# Patient Record
Sex: MALE | Race: BLACK OR AFRICAN AMERICAN | NOT HISPANIC OR LATINO | URBAN - METROPOLITAN AREA
[De-identification: names, ages, dates, MRNs, and addresses within clinical notes are randomized per-mention and may not be internally consistent; named-entity substitution may affect disease eponyms.]

---

## 2018-11-01 ENCOUNTER — EMERGENCY (EMERGENCY)
Facility: HOSPITAL | Age: 48
LOS: 1 days | Discharge: HOME | End: 2018-11-01
Attending: STUDENT IN AN ORGANIZED HEALTH CARE EDUCATION/TRAINING PROGRAM
Payer: MEDICARE

## 2018-11-01 VITALS
HEART RATE: 84 BPM | DIASTOLIC BLOOD PRESSURE: 98 MMHG | RESPIRATION RATE: 20 BRPM | SYSTOLIC BLOOD PRESSURE: 177 MMHG | OXYGEN SATURATION: 95 % | WEIGHT: 184.97 LBS | HEIGHT: 65 IN | TEMPERATURE: 98 F

## 2018-11-01 PROCEDURE — 99283 EMERGENCY DEPT VISIT LOW MDM: CPT

## 2018-11-01 NOTE — ED PROVIDER NOTE - OBJECTIVE STATEMENT
48 y.o w/ no sig pmh presenting with 2 days of left facial swelling. endorses mild pain. denies difficulty speaking, headache, n, v, fever, difficulty swallowing.

## 2018-11-01 NOTE — ED PROVIDER NOTE - MEDICAL DECISION MAKING DETAILS
jesse vs dental abscess. abx, pmd/dental f/u patient presenting with left facial swelling. no erythema of skin to suggest cellulitis. poor dentition +. no ttp of oral floor. no resp distress, difficulty swallowing. likely dental abscess vs sialadenitis. will give abx. dental and pmd f.u. otherwise vital normal. tolerating po. no signs of sepsis. no signs of deeper infection on exam. return precaution for airway involvement, fever, vomiting or worsening swelling not responding to abx.

## 2018-11-03 ENCOUNTER — EMERGENCY (EMERGENCY)
Facility: HOSPITAL | Age: 48
LOS: 1 days | Discharge: ANOTHER HOSPITAL | End: 2018-11-03
Attending: EMERGENCY MEDICINE
Payer: MEDICARE

## 2018-11-03 ENCOUNTER — EMERGENCY (EMERGENCY)
Facility: HOSPITAL | Age: 48
LOS: 1 days | Discharge: HOME | End: 2018-11-03
Attending: EMERGENCY MEDICINE | Admitting: EMERGENCY MEDICINE
Payer: MEDICARE

## 2018-11-03 VITALS
OXYGEN SATURATION: 97 % | SYSTOLIC BLOOD PRESSURE: 143 MMHG | RESPIRATION RATE: 16 BRPM | TEMPERATURE: 99 F | HEART RATE: 63 BPM | DIASTOLIC BLOOD PRESSURE: 93 MMHG

## 2018-11-03 VITALS
OXYGEN SATURATION: 98 % | SYSTOLIC BLOOD PRESSURE: 141 MMHG | RESPIRATION RATE: 20 BRPM | DIASTOLIC BLOOD PRESSURE: 79 MMHG | HEART RATE: 69 BPM

## 2018-11-03 VITALS
WEIGHT: 182.98 LBS | SYSTOLIC BLOOD PRESSURE: 152 MMHG | RESPIRATION RATE: 18 BRPM | OXYGEN SATURATION: 96 % | DIASTOLIC BLOOD PRESSURE: 87 MMHG | HEART RATE: 80 BPM | TEMPERATURE: 99 F
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
## 2018-11-03 LAB
GRAM STN SPEC: SIGNIFICANT CHANGE UP
SPECIMEN SOURCE: SIGNIFICANT CHANGE UP

## 2018-11-03 PROCEDURE — 99220: CPT

## 2018-11-03 PROCEDURE — 99285 EMERGENCY DEPT VISIT HI MDM: CPT | Mod: 25

## 2018-11-03 PROCEDURE — 87040 BLOOD CULTURE FOR BACTERIA: CPT

## 2018-11-03 PROCEDURE — 70491 CT SOFT TISSUE NECK W/DYE: CPT | Mod: 26

## 2018-11-03 PROCEDURE — 99285 EMERGENCY DEPT VISIT HI MDM: CPT

## 2018-11-03 PROCEDURE — 85027 COMPLETE CBC AUTOMATED: CPT

## 2018-11-03 PROCEDURE — 96374 THER/PROPH/DIAG INJ IV PUSH: CPT | Mod: XU

## 2018-11-03 PROCEDURE — 80053 COMPREHEN METABOLIC PANEL: CPT

## 2018-11-03 PROCEDURE — 70491 CT SOFT TISSUE NECK W/DYE: CPT

## 2018-11-03 RX ORDER — OXYCODONE AND ACETAMINOPHEN 5; 325 MG/1; MG/1
1 TABLET ORAL ONCE
Qty: 0 | Refills: 0 | Status: DISCONTINUED | OUTPATIENT
Start: 2018-11-03 | End: 2018-11-03

## 2018-11-03 RX ORDER — MORPHINE SULFATE 50 MG/1
2 CAPSULE, EXTENDED RELEASE ORAL ONCE
Qty: 0 | Refills: 0 | Status: DISCONTINUED | OUTPATIENT
Start: 2018-11-03 | End: 2018-11-03

## 2018-11-03 RX ORDER — MORPHINE SULFATE 50 MG/1
4 CAPSULE, EXTENDED RELEASE ORAL ONCE
Qty: 0 | Refills: 0 | Status: DISCONTINUED | OUTPATIENT
Start: 2018-11-03 | End: 2018-11-03

## 2018-11-03 RX ORDER — KETOROLAC TROMETHAMINE 30 MG/ML
30 SYRINGE (ML) INJECTION EVERY 6 HOURS
Qty: 0 | Refills: 0 | Status: COMPLETED | OUTPATIENT
Start: 2018-11-03 | End: 2018-11-04

## 2018-11-03 RX ORDER — KETOROLAC TROMETHAMINE 30 MG/ML
15 SYRINGE (ML) INJECTION ONCE
Qty: 0 | Refills: 0 | Status: DISCONTINUED | OUTPATIENT
Start: 2018-11-03 | End: 2018-11-03

## 2018-11-03 RX ADMIN — Medication 900 MILLIGRAM(S): at 22:05

## 2018-11-03 RX ADMIN — OXYCODONE AND ACETAMINOPHEN 1 TABLET(S): 5; 325 TABLET ORAL at 21:00

## 2018-11-03 RX ADMIN — MORPHINE SULFATE 4 MILLIGRAM(S): 50 CAPSULE, EXTENDED RELEASE ORAL at 22:00

## 2018-11-03 RX ADMIN — Medication 30 MILLIGRAM(S): at 23:38

## 2018-11-03 RX ADMIN — OXYCODONE AND ACETAMINOPHEN 1 TABLET(S): 5; 325 TABLET ORAL at 13:16

## 2018-11-03 RX ADMIN — MORPHINE SULFATE 4 MILLIGRAM(S): 50 CAPSULE, EXTENDED RELEASE ORAL at 21:25

## 2018-11-03 RX ADMIN — Medication 15 MILLIGRAM(S): at 20:50

## 2018-11-03 RX ADMIN — Medication 100 MILLIGRAM(S): at 13:15

## 2018-11-03 RX ADMIN — MORPHINE SULFATE 2 MILLIGRAM(S): 50 CAPSULE, EXTENDED RELEASE ORAL at 17:28

## 2018-11-03 RX ADMIN — MORPHINE SULFATE 2 MILLIGRAM(S): 50 CAPSULE, EXTENDED RELEASE ORAL at 17:45

## 2018-11-03 RX ADMIN — Medication 15 MILLIGRAM(S): at 17:28

## 2018-11-03 RX ADMIN — Medication 100 MILLIGRAM(S): at 21:33

## 2018-11-03 RX ADMIN — OXYCODONE AND ACETAMINOPHEN 1 TABLET(S): 5; 325 TABLET ORAL at 21:25

## 2018-11-03 NOTE — ED PROVIDER NOTE - PROGRESS NOTE DETAILS
Discussed results with OMFS, patient to be transferred to [facility] for dental eval with possible OMFS eval, will transfer to [facility] ED with Dr. Tay accepting.

## 2018-11-03 NOTE — ED PROVIDER NOTE - PHYSICAL EXAMINATION
FACE & NECK: swelling to L side of face. L lower jaw w/ large area of induration, swelling, redness, tenderness, extending down underneath jaw to L neck.

## 2018-11-03 NOTE — CONSULT NOTE ADULT - ASSESSMENT
48y old male with localized facial swelling   - Localized swelling is likely follicular in origin and not tooth related  - Consult plastics for I&D or other management of facial swelling  - Discussed with patient importance of improved oral hygiene and need to see general dentist in future

## 2018-11-03 NOTE — ED CDU PROVIDER INITIAL DAY NOTE - INDICATION FOR OBSERVATION
Therapeutic Intensity Therapeutic Intensity/IV antibiotics, supportive care, analgesia PRN, general observation care / monitoring.

## 2018-11-03 NOTE — ED PROVIDER NOTE - PROGRESS NOTE DETAILS
Cabot: Dental and OMFS do not feel that this is tooth related. Cabot: Plastics was able to drain a significant amount of purulent material from the cheek.  Will speak with CDU about IV abx and observation.

## 2018-11-03 NOTE — ED CDU PROVIDER INITIAL DAY NOTE - ENMT, MLM
Airway patent. Nasal mucosa clear. Mouth with moist mucosa. Pt s/p I&D; left cheek with gauze 4x4 partially packing I&D site; overlying gauze clean with minimal blood; no active bleeding noted from wound.  Neck supple.
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
## 2018-11-03 NOTE — ED CDU PROVIDER INITIAL DAY NOTE - PROGRESS NOTE DETAILS
Cabot: 48M who was transferred from [facility] for L lower face infection with possible tooth involvement x 4d.  Has been prescribed clinda, which has not resulted in improvement.  At [facility] pt had CT showing possible infected left maxillary first molar with development of a radicular cyst and adjacent myositis, soft tissue swelling, edema and cellulitis/phlegmon without drainable fluid collection, as well a reactive left-sided cervical lymphadenopathy.  Tolerates PO, no trouble breathing or swallowing.  Dental saw the patient and did not feel this was a dental issue.  Plastics I+D's the cheek with significant purulent material drainage.  Will admit to the CDU for IV abx and monitoring for improvement.  On exam, HDS, NAD, MMM, teeth NTTP, + L lower facial edema, induration, erythema with TTP s/p I+D, extending down the neck, eyes clear, lungs CTAB, heart sounds normal, abd soft, NT, ND, no CVAT, LEs without edema, wwp, neuro: alert and oriented, no focal deficits.

## 2018-11-03 NOTE — ED CDU PROVIDER INITIAL DAY NOTE - ATTENDING CONTRIBUTION TO CARE
I, Jennifer Cabot, MD, have performed a history and physical exam of the patient and discussed their management with the ACP.  I reviewed the PA's note and agree with the documented findings and plan of care. My medical decision making and observations are found above.    Cabot: 48M who was transferred from [facility] for L lower face infection with possible tooth involvement x 4d.  Has been prescribed clinda, which has not resulted in improvement.  At [facility] pt had CT showing possible infected left maxillary first molar with development of a radicular cyst and adjacent myositis, soft tissue swelling, edema and cellulitis/phlegmon without drainable fluid collection, as well a reactive left-sided cervical lymphadenopathy.  Tolerates PO, no trouble breathing or swallowing.  Dental saw the patient and did not feel this was a dental issue.  Plastics I+D's the cheek with significant purulent material drainage.  Will admit to the CDU for IV abx and monitoring for improvement.  On exam, HDS, NAD, MMM, teeth NTTP, + L lower facial edema, induration, erythema with TTP s/p I+D, extending down the neck, eyes clear, lungs CTAB, heart sounds normal, abd soft, NT, ND, no CVAT, LEs without edema, wwp, neuro: alert and oriented, no focal deficits.

## 2018-11-03 NOTE — ED ADULT NURSE NOTE - CHIEF COMPLAINT QUOTE
BIBEMS as transfer from [facility] for abscess to L face. Given Percocet x 1 and Clindamycin IV at [facility]. Arrives with IV to L ac 20g. Hx: HARRY

## 2018-11-03 NOTE — ED PROVIDER NOTE - NOTES
Initially spoke with [facility] Noble Horta who was aware of patient who states he reviewed CT scan and recommends dental evaluation first.  [facility] states they are available if dental team requires it.
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
## 2018-11-03 NOTE — ED PROVIDER NOTE - OBJECTIVE STATEMENT
49 y/o M w/ no significant PMHx presents to ED w/ c/o mass to L side of face, w/ worsening swelling and spreading down to neck x 2 days. Pt was seen by PMD and given Clindamycin, did not have any imaging study done at the time. Pt is now in ED due to worsening symptoms despite compliance w/ medication. Pt denies any dental pain, fever, or any other complaints. Allergy: Penicillin

## 2018-11-03 NOTE — ED ADULT NURSE NOTE - OBJECTIVE STATEMENT
Patient presents to ED c/o left sided facial mass. As per patient it started out 4 days ago with mild swelling increased , was in ER 2 days ago was discharged on Clindamycin and was sent home. Swelling continue with pain and difficulty swallowing so patient came back to Ed.

## 2018-11-03 NOTE — ED PROVIDER NOTE - ATTENDING CONTRIBUTION TO CARE
I, Jennifer Cabot, MD, have performed a history and physical exam of the patient and discussed their management with the ACP.  I reviewed the PA's note and agree with the documented findings and plan of care. My medical decision making and observations are found above.    Cabot: 48M who was transferred from [facility] for L lower face infection with possible tooth involvement x 4d.  Has been prescribed clinda, which has not resulted in improvement.  At [facility] pt had CT showing infected left maxillary first molar with development of a radicular cyst and adjacent myositis, soft tissue swelling, edema and cellulitis/phlegmon without drainable fluid collection, as well a reactive left-sided cervical lymphadenopathy.  Tolerates PO, no trouble breathing or swallowing.  On exam, HDS, NAD, MMM, teeth NTTP, + L lower facial edema, induration, erythema with TTP, no fluctuance, extending down the neck, eyes clear, lungs CTAB, heart sounds normal, abd soft, NT, ND, no CVAT, LEs without edema, wwp, neuro: alert and oriented, no focal deficits.  Will consult dental and OMFS, will require admission for IV abx.

## 2018-11-03 NOTE — ED ADULT NURSE NOTE - ED STAT RN HANDOFF DETAILS
Patient to be transferred to [facility] report given to Krysten at [facility] , patient to be picked up within the hour, pain medication along with antibiotic  given continue to monitor patient.
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
## 2018-11-03 NOTE — ED ADULT TRIAGE NOTE - CHIEF COMPLAINT QUOTE
BIBEMS as transfer from [facility] for abscess to L face. Given Percocet x 1 and Clindamycin IV at [facility]. Arrives with IV to L ac 20g. Hx: HARRY

## 2018-11-03 NOTE — DOWNTIME INTERRUPTION NOTE - WHICH MANUAL FORMS INITIATED?
Emergency Department Nursing Care Record  Emergency triage form  Emergency department Laboratory orders

## 2018-11-03 NOTE — ED CDU PROVIDER INITIAL DAY NOTE - MEDICAL DECISION MAKING DETAILS
Cabot: 48M who was transferred from [facility] for L lower face infection with possible tooth involvement x 4d.  Has been prescribed clinda, which has not resulted in improvement.  At [facility] pt had CT showing possible infected left maxillary first molar with development of a radicular cyst and adjacent myositis, soft tissue swelling, edema and cellulitis/phlegmon without drainable fluid collection, as well a reactive left-sided cervical lymphadenopathy.  Tolerates PO, no trouble breathing or swallowing.  Dental saw the patient and did not feel this was a dental issue.  Plastics I+D's the cheek with significant purulent material drainage.  Will admit to the CDU for IV abx and monitoring for improvement.  On exam, HDS, NAD, MMM, teeth NTTP, + L lower facial edema, induration, erythema with TTP s/p I+D, extending down the neck, eyes clear, lungs CTAB, heart sounds normal, abd soft, NT, ND, no CVAT, LEs without edema, wwp, neuro: alert and oriented, no focal deficits.

## 2018-11-03 NOTE — ED ADULT NURSE NOTE - CHPI ED NUR SYMPTOMS NEG
no disorientation/no paranoia/no change in level of consciousness/no weight loss/no fever/no hallucinations/no weakness/no agitation/no suicidal

## 2018-11-03 NOTE — ED PROVIDER NOTE - SKIN LOCATION #1
8 cm x 4 cm area of induration, swelling, redness and warmth with associated tenderness to left lower aspect of face; no open areas; no crepitus

## 2018-11-03 NOTE — ED PROVIDER NOTE - MEDICAL DECISION MAKING DETAILS
49 y/o M here w/ worsening swelling to L side of face, jaw, neck. Will need to check CT-scan maxillofacial/ neck to evaluate for infection. Will check labs and consider possible ENT transfer.

## 2018-11-03 NOTE — ED ADULT NURSE NOTE - NSIMPLEMENTINTERV_GEN_ALL_ED
Implemented All Universal Safety Interventions:  [place] to call system. Call bell, personal items and telephone within reach. Instruct patient to call for assistance. Room bathroom lighting operational. Non-slip footwear when patient is off stretcher. Physically safe environment: no spills, clutter or unnecessary equipment. Stretcher in lowest position, wheels locked, appropriate side rails in place.

## 2018-11-03 NOTE — ED ADULT NURSE NOTE - OBJECTIVE STATEMENT
Presents to ED complaining of left facial edema and pain x 4 days, worse now.  Seen by PCP and given Clindamycin without relief. Presents to ED complaining of left facial edema and pain x 4 days, worse now.  Seen by PCP and given Clindamycin without relief.  No fever, chills or difficulty swallowing.  Denies trauma to face.

## 2018-11-04 VITALS
RESPIRATION RATE: 16 BRPM | OXYGEN SATURATION: 98 % | HEART RATE: 67 BPM | SYSTOLIC BLOOD PRESSURE: 148 MMHG | DIASTOLIC BLOOD PRESSURE: 90 MMHG | TEMPERATURE: 99 F

## 2018-11-04 PROCEDURE — 99217: CPT

## 2018-11-04 RX ORDER — OXYCODONE AND ACETAMINOPHEN 5; 325 MG/1; MG/1
1 TABLET ORAL ONCE
Qty: 0 | Refills: 0 | Status: DISCONTINUED | OUTPATIENT
Start: 2018-11-04 | End: 2018-11-04

## 2018-11-04 RX ADMIN — Medication 30 MILLIGRAM(S): at 08:22

## 2018-11-04 RX ADMIN — Medication 100 MILLIGRAM(S): at 06:15

## 2018-11-04 RX ADMIN — Medication 30 MILLIGRAM(S): at 06:15

## 2018-11-04 RX ADMIN — Medication 30 MILLIGRAM(S): at 00:10

## 2018-11-04 RX ADMIN — OXYCODONE AND ACETAMINOPHEN 1 TABLET(S): 5; 325 TABLET ORAL at 02:33

## 2018-11-04 RX ADMIN — OXYCODONE AND ACETAMINOPHEN 1 TABLET(S): 5; 325 TABLET ORAL at 03:20
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
## 2018-11-04 NOTE — ED CDU PROVIDER SUBSEQUENT DAY NOTE - PROGRESS NOTE DETAILS
Pt objectively noted to be resting comfortably in the interim; no issues or c/o thus far.  Will continue to monitor; pt will be signed out to the day CDU attending and PA at 0700 hrs. Upon discharge pt states he has 8 days left of clinda dosed as 300mg 4x daily, will send extra 2 days of abx to continue pt on 300mg every 4 hours dosing Upon discharge pt states he has 8 days left of clinda dosed as 300mg 4x daily, will send extra 2 days of abx to continue pt on 300mg every 4 hours dosing. Packing changed prior to d/c, pt given supplies to change packing at home and wife understands how to change the packing
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
## 2018-11-04 NOTE — PROVIDER CONTACT NOTE (OTHER) - ASSESSMENT
Medical team  referred this case as pt has been medically cleared for discharge. Pt was seen in [place] and was transferred to treatment but pt claims he has no money and or family to come and  at this time. Montser spoke with the medical team and was informed pt could go via cab. Writer contacted [employer] cab # [phone] and  cab fare $ 58. Writer spoke with  BARRINGTON Meier ([id number]) agreed to pay the maximum limit $ 50 and pt willing to pay $ 8 and writer informed that it is  one time courtesy  deal.  Writer informed to the pt and was in agreement. Writer informed the medical team.
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
## 2018-11-04 NOTE — ED CDU PROVIDER DISPOSITION NOTE - PLAN OF CARE
Follow up with plastic surgery Dr.Sayeed Pretty in 1 week, referral information provided  Follow up with your primary care provider within 48 hours  Change packing daily  Take Clindamycin 450mg (3 tablets) 3 times a day for 10 days  Take Motrin 600mg every 6 hours for pain, take with food  Take Percocet 1 tablet every 6 hours for severe pain, caution drowsiness do not drive or drink alcohol while taking  Return to the ER with any worsening or concerning symptoms, increased pain or swelling, fever, chills, pus drainage or any other concerns.

## 2018-11-06 LAB — CULTURE RESULTS: SIGNIFICANT CHANGE UP

## 2018-11-06 RX ORDER — AZTREONAM 2 G
1 VIAL (EA) INJECTION
Qty: 20 | Refills: 0 | OUTPATIENT
Start: 2018-11-06 | End: 2018-11-15

## 2018-11-06 NOTE — ED POST DISCHARGE NOTE - REASON FOR FOLLOW-UP
Other Patient called saying having allergic reaction to clindamycin, having hives. Stopped taking the clindamycin, asking for a new prescription. Cx still do not have sensitivities, sent rx for bactrim, and told pt to f/u with plastics and let them know about change in meds.
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
## 2020-11-06 NOTE — ED ADULT TRIAGE NOTE - WEIGHT IN KG
Please schedule for colonoscopy and egd  History of colon polyps, acid reflux  Suprep, two bottles mag citrate one with each part of prep    Miralax bid until colonoscopy      
83.9

## 2022-01-15 ENCOUNTER — EMERGENCY (EMERGENCY)
Facility: HOSPITAL | Age: 52
LOS: 1 days | Discharge: HOME | End: 2022-01-15
Attending: STUDENT IN AN ORGANIZED HEALTH CARE EDUCATION/TRAINING PROGRAM | Admitting: STUDENT IN AN ORGANIZED HEALTH CARE EDUCATION/TRAINING PROGRAM
Payer: MEDICARE

## 2022-01-15 VITALS
OXYGEN SATURATION: 100 % | RESPIRATION RATE: 18 BRPM | HEIGHT: 65 IN | DIASTOLIC BLOOD PRESSURE: 85 MMHG | TEMPERATURE: 98 F | SYSTOLIC BLOOD PRESSURE: 145 MMHG | HEART RATE: 78 BPM
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
## 2022-01-15 PROBLEM — F17.210 NICOTINE DEPENDENCE, CIGARETTES, UNCOMPLICATED: Chronic | Status: ACTIVE | Noted: 2018-11-04

## 2022-01-15 PROCEDURE — 99283 EMERGENCY DEPT VISIT LOW MDM: CPT

## 2022-01-15 RX ORDER — FAMOTIDINE 10 MG/ML
20 INJECTION INTRAVENOUS ONCE
Refills: 0 | Status: COMPLETED | OUTPATIENT
Start: 2022-01-15 | End: 2022-01-15

## 2022-01-15 RX ORDER — ESOMEPRAZOLE MAGNESIUM 40 MG/1
1 CAPSULE, DELAYED RELEASE ORAL
Qty: 30 | Refills: 0
Start: 2022-01-15 | End: 2022-02-13

## 2022-01-15 RX ORDER — LIDOCAINE 4 G/100G
10 CREAM TOPICAL ONCE
Refills: 0 | Status: COMPLETED | OUTPATIENT
Start: 2022-01-15 | End: 2022-01-15

## 2022-01-15 RX ORDER — ONDANSETRON 8 MG/1
4 TABLET, FILM COATED ORAL ONCE
Refills: 0 | Status: COMPLETED | OUTPATIENT
Start: 2022-01-15 | End: 2022-01-15

## 2022-01-15 RX ADMIN — LIDOCAINE 10 MILLILITER(S): 4 CREAM TOPICAL at 10:33

## 2022-01-15 RX ADMIN — FAMOTIDINE 20 MILLIGRAM(S): 10 INJECTION INTRAVENOUS at 10:31

## 2022-01-15 RX ADMIN — Medication 30 MILLILITER(S): at 10:32

## 2022-01-15 RX ADMIN — ONDANSETRON 4 MILLIGRAM(S): 8 TABLET, FILM COATED ORAL at 10:31
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
## 2022-01-15 NOTE — ED ADULT NURSE NOTE - OBJECTIVE STATEMENT
Receive pt. in Results waiting alert and oriented x 4, presenting to the ER with complaints of vomiting and stomach pain. Pt. stated" I try some new food with my son and my stomach has been hurting and I have some vomiting since Friday". Medicated as ordered, no active vomiting at present, will continue to monitor.

## 2022-01-15 NOTE — ED ADULT NURSE NOTE - NSIMPLEMENTINTERV_GEN_ALL_ED
Implemented All Universal Safety Interventions:  [place] to call system. Call bell, personal items and telephone within reach. Instruct patient to call for assistance. Room bathroom lighting operational. Non-slip footwear when patient is off stretcher. Physically safe environment: no spills, clutter or unnecessary equipment. Stretcher in lowest position, wheels locked, appropriate side rails in place.
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
## 2022-01-15 NOTE — ED PROVIDER NOTE - NS ED ROS FT
Constitutional:  See HPI  ENMT: No neck pain or stiffness  Cardiac:  No chest pain  Respiratory:  No cough or respiratory distress.   GI:  +nausea/vomiting, burning epigastric pain now improved; no diarrhea  MS:  No back pain.  Neuro:  No headache   Skin:  No skin rash  Except as documented in the HPI,  all other systems are negative

## 2022-01-15 NOTE — ED PROVIDER NOTE - PATIENT PORTAL LINK FT
You can access the FollowMyHealth Patient Portal offered by [facility] [place] by registering at the following website: [email or web address]. By joining [employer]s FollowMyHealth portal, you will also be able to view your health information using other applications (apps) compatible with our system.

## 2022-01-15 NOTE — ED PROVIDER NOTE - NSFOLLOWUPINSTRUCTIONS_ED_ALL_ED_FT
Gastritis    Gastritis is soreness and swelling (inflammation) of the lining of the stomach. Gastritis can develop as a sudden onset (acute) or long-term (chronic) condition. If gastritis is not treated, it can lead to stomach bleeding and ulcers. Causes include viral and bacterial infections, excessive alcohol consumption, tobacco use, or certain medications. Symptoms include nausea, vomiting, or abdominal pain or burning especially after eating. Avoid foods or drinks that make your symptoms worse such as caffeine, chocolate, spicy foods, acidic foods, or alcohol.    Please follow up with your Gastroenterologist for further evaluation.  Continue Nexium for now.     SEEK IMMEDIATE MEDICAL CARE IF YOU HAVE ANY OF THE FOLLOWING SYMPTOMS: black or bloody stools, blood or coffee-ground-colored vomitus, worsening abdominal pain, fever, or inability to keep fluids down.

## 2022-01-15 NOTE — ED PROVIDER NOTE - NSFOLLOWUPCLINICS_GEN_ALL_ED_FT
[facility]  Gastroenterology  [address] [place] [address] [address]  [place] [address]  Phone: [phone]  Fax:

## 2022-01-15 NOTE — ED PROVIDER NOTE - PHYSICAL EXAMINATION
CONSTITUTIONAL: NAD, comfortable appearing, non-toxic  SKIN: Warm dry  HEAD: NCAT  EYES: NL inspection  ENT: MMM  NECK: Supple; non tender.  CARD: RRR  RESP: CTAB  ABD: soft, NTND, no epigastric tenderness, normoactive BS.   EXT: no pedal edema  NEURO: Grossly unremarkable  PSYCH: Cooperative, appropriate.

## 2022-02-17 ENCOUNTER — EMERGENCY (EMERGENCY)
Facility: HOSPITAL | Age: 52
LOS: 1 days | Discharge: HOME | End: 2022-02-17
Attending: EMERGENCY MEDICINE | Admitting: EMERGENCY MEDICINE
Payer: MEDICARE

## 2022-02-17 VITALS
DIASTOLIC BLOOD PRESSURE: 99 MMHG | SYSTOLIC BLOOD PRESSURE: 154 MMHG | OXYGEN SATURATION: 97 % | RESPIRATION RATE: 18 BRPM | HEIGHT: 65 IN | TEMPERATURE: 98 F | HEART RATE: 86 BPM
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
## 2022-02-17 LAB
ALBUMIN SERPL ELPH-MCNC: 4.4 G/DL — SIGNIFICANT CHANGE UP (ref 3.3–5)
ALP SERPL-CCNC: 61 U/L — SIGNIFICANT CHANGE UP (ref 40–120)
ALT FLD-CCNC: 24 U/L — SIGNIFICANT CHANGE UP (ref 4–41)
ANION GAP SERPL CALC-SCNC: 13 MMOL/L — SIGNIFICANT CHANGE UP (ref 7–14)
APPEARANCE UR: CLEAR — SIGNIFICANT CHANGE UP
AST SERPL-CCNC: 28 U/L — SIGNIFICANT CHANGE UP (ref 4–40)
B PERT DNA SPEC QL NAA+PROBE: SIGNIFICANT CHANGE UP
B PERT+PARAPERT DNA PNL SPEC NAA+PROBE: SIGNIFICANT CHANGE UP
BASOPHILS # BLD AUTO: 0.02 K/UL — SIGNIFICANT CHANGE UP (ref 0–0.2)
BASOPHILS NFR BLD AUTO: 0.3 % — SIGNIFICANT CHANGE UP (ref 0–2)
BILIRUB SERPL-MCNC: 0.8 MG/DL — SIGNIFICANT CHANGE UP (ref 0.2–1.2)
BILIRUB UR-MCNC: NEGATIVE — SIGNIFICANT CHANGE UP
BORDETELLA PARAPERTUSSIS (RAPRVP): SIGNIFICANT CHANGE UP
BUN SERPL-MCNC: 8 MG/DL — SIGNIFICANT CHANGE UP (ref 7–23)
C PNEUM DNA SPEC QL NAA+PROBE: SIGNIFICANT CHANGE UP
CALCIUM SERPL-MCNC: 9.4 MG/DL — SIGNIFICANT CHANGE UP (ref 8.4–10.5)
CHLORIDE SERPL-SCNC: 105 MMOL/L — SIGNIFICANT CHANGE UP (ref 98–107)
CO2 SERPL-SCNC: 20 MMOL/L — LOW (ref 22–31)
COLOR SPEC: YELLOW — SIGNIFICANT CHANGE UP
CREAT SERPL-MCNC: 0.72 MG/DL — SIGNIFICANT CHANGE UP (ref 0.5–1.3)
DIFF PNL FLD: NEGATIVE — SIGNIFICANT CHANGE UP
EOSINOPHIL # BLD AUTO: 0.04 K/UL — SIGNIFICANT CHANGE UP (ref 0–0.5)
EOSINOPHIL NFR BLD AUTO: 0.6 % — SIGNIFICANT CHANGE UP (ref 0–6)
FLUAV SUBTYP SPEC NAA+PROBE: SIGNIFICANT CHANGE UP
FLUBV RNA SPEC QL NAA+PROBE: SIGNIFICANT CHANGE UP
GLUCOSE SERPL-MCNC: 95 MG/DL — SIGNIFICANT CHANGE UP (ref 70–99)
GLUCOSE UR QL: NEGATIVE — SIGNIFICANT CHANGE UP
HADV DNA SPEC QL NAA+PROBE: SIGNIFICANT CHANGE UP
HCOV 229E RNA SPEC QL NAA+PROBE: SIGNIFICANT CHANGE UP
HCOV HKU1 RNA SPEC QL NAA+PROBE: SIGNIFICANT CHANGE UP
HCOV NL63 RNA SPEC QL NAA+PROBE: SIGNIFICANT CHANGE UP
HCOV OC43 RNA SPEC QL NAA+PROBE: SIGNIFICANT CHANGE UP
HCT VFR BLD CALC: 39.5 % — SIGNIFICANT CHANGE UP (ref 39–50)
HGB BLD-MCNC: 13.4 G/DL — SIGNIFICANT CHANGE UP (ref 13–17)
HMPV RNA SPEC QL NAA+PROBE: SIGNIFICANT CHANGE UP
HPIV1 RNA SPEC QL NAA+PROBE: SIGNIFICANT CHANGE UP
HPIV2 RNA SPEC QL NAA+PROBE: SIGNIFICANT CHANGE UP
HPIV3 RNA SPEC QL NAA+PROBE: SIGNIFICANT CHANGE UP
HPIV4 RNA SPEC QL NAA+PROBE: SIGNIFICANT CHANGE UP
IANC: 4.25 K/UL — SIGNIFICANT CHANGE UP (ref 1.5–8.5)
IMM GRANULOCYTES NFR BLD AUTO: 0.1 % — SIGNIFICANT CHANGE UP (ref 0–1.5)
KETONES UR-MCNC: NEGATIVE — SIGNIFICANT CHANGE UP
LEUKOCYTE ESTERASE UR-ACNC: NEGATIVE — SIGNIFICANT CHANGE UP
LIDOCAIN IGE QN: 22 U/L — SIGNIFICANT CHANGE UP (ref 7–60)
LYMPHOCYTES # BLD AUTO: 2.34 K/UL — SIGNIFICANT CHANGE UP (ref 1–3.3)
LYMPHOCYTES # BLD AUTO: 32.7 % — SIGNIFICANT CHANGE UP (ref 13–44)
M PNEUMO DNA SPEC QL NAA+PROBE: SIGNIFICANT CHANGE UP
MCHC RBC-ENTMCNC: 32.1 PG — SIGNIFICANT CHANGE UP (ref 27–34)
MCHC RBC-ENTMCNC: 33.9 GM/DL — SIGNIFICANT CHANGE UP (ref 32–36)
MCV RBC AUTO: 94.5 FL — SIGNIFICANT CHANGE UP (ref 80–100)
MONOCYTES # BLD AUTO: 0.5 K/UL — SIGNIFICANT CHANGE UP (ref 0–0.9)
MONOCYTES NFR BLD AUTO: 7 % — SIGNIFICANT CHANGE UP (ref 2–14)
NEUTROPHILS # BLD AUTO: 4.25 K/UL — SIGNIFICANT CHANGE UP (ref 1.8–7.4)
NEUTROPHILS NFR BLD AUTO: 59.3 % — SIGNIFICANT CHANGE UP (ref 43–77)
NITRITE UR-MCNC: NEGATIVE — SIGNIFICANT CHANGE UP
NRBC # BLD: 0 /100 WBCS — SIGNIFICANT CHANGE UP
NRBC # FLD: 0 K/UL — SIGNIFICANT CHANGE UP
PH UR: 6.5 — SIGNIFICANT CHANGE UP (ref 5–8)
PLATELET # BLD AUTO: 147 K/UL — LOW (ref 150–400)
POTASSIUM SERPL-MCNC: 4 MMOL/L — SIGNIFICANT CHANGE UP (ref 3.5–5.3)
POTASSIUM SERPL-SCNC: 4 MMOL/L — SIGNIFICANT CHANGE UP (ref 3.5–5.3)
PROT SERPL-MCNC: 6.7 G/DL — SIGNIFICANT CHANGE UP (ref 6–8.3)
PROT UR-MCNC: ABNORMAL
RAPID RVP RESULT: SIGNIFICANT CHANGE UP
RBC # BLD: 4.18 M/UL — LOW (ref 4.2–5.8)
RBC # FLD: 12.1 % — SIGNIFICANT CHANGE UP (ref 10.3–14.5)
RSV RNA SPEC QL NAA+PROBE: SIGNIFICANT CHANGE UP
RV+EV RNA SPEC QL NAA+PROBE: SIGNIFICANT CHANGE UP
SARS-COV-2 RNA SPEC QL NAA+PROBE: SIGNIFICANT CHANGE UP
SODIUM SERPL-SCNC: 138 MMOL/L — SIGNIFICANT CHANGE UP (ref 135–145)
SP GR SPEC: >1.05 (ref 1–1.05)
UROBILINOGEN FLD QL: SIGNIFICANT CHANGE UP
WBC # BLD: 7.16 K/UL — SIGNIFICANT CHANGE UP (ref 3.8–10.5)
WBC # FLD AUTO: 7.16 K/UL — SIGNIFICANT CHANGE UP (ref 3.8–10.5)

## 2022-02-17 PROCEDURE — 99284 EMERGENCY DEPT VISIT MOD MDM: CPT | Mod: 25,GC

## 2022-02-17 PROCEDURE — 74177 CT ABD & PELVIS W/CONTRAST: CPT | Mod: 26,MA

## 2022-02-17 PROCEDURE — 93010 ELECTROCARDIOGRAM REPORT: CPT

## 2022-02-17 RX ORDER — KETOROLAC TROMETHAMINE 30 MG/ML
15 SYRINGE (ML) INJECTION ONCE
Refills: 0 | Status: DISCONTINUED | OUTPATIENT
Start: 2022-02-17 | End: 2022-02-17

## 2022-02-17 RX ORDER — ONDANSETRON 8 MG/1
4 TABLET, FILM COATED ORAL ONCE
Refills: 0 | Status: COMPLETED | OUTPATIENT
Start: 2022-02-17 | End: 2022-02-17

## 2022-02-17 RX ORDER — ACETAMINOPHEN 500 MG
975 TABLET ORAL ONCE
Refills: 0 | Status: COMPLETED | OUTPATIENT
Start: 2022-02-17 | End: 2022-02-17

## 2022-02-17 RX ORDER — SODIUM CHLORIDE 9 MG/ML
1000 INJECTION INTRAMUSCULAR; INTRAVENOUS; SUBCUTANEOUS ONCE
Refills: 0 | Status: COMPLETED | OUTPATIENT
Start: 2022-02-17 | End: 2022-02-17

## 2022-02-17 RX ADMIN — ONDANSETRON 4 MILLIGRAM(S): 8 TABLET, FILM COATED ORAL at 11:39

## 2022-02-17 RX ADMIN — Medication 15 MILLIGRAM(S): at 11:16

## 2022-02-17 RX ADMIN — Medication 975 MILLIGRAM(S): at 13:10

## 2022-02-17 RX ADMIN — SODIUM CHLORIDE 1000 MILLILITER(S): 9 INJECTION INTRAMUSCULAR; INTRAVENOUS; SUBCUTANEOUS at 11:17

## 2022-02-17 NOTE — ED ADULT TRIAGE NOTE - ARRIVAL FROM
Home
Comprehensive Metabolic Panel in AM (01.25.17 @ 06:20)    Sodium, Serum: 145 mmoL/L    Potassium, Serum: 3.7 mmoL/L    Chloride, Serum: 111 mmoL/L    Carbon Dioxide, Serum: 19 mmoL/L    Anion Gap, Serum: 15 mmoL/L    Blood Urea Nitrogen, Serum: 48 mg/dL    Creatinine, Serum: 2.26 mg/dL    Glucose, Serum: 63 mg/dL    Calcium, Total Serum: 5.6: TYPE:(C=Critical, N=Notification, A=Abnormal) C                  7.3    8.9   )-----------( 263      ( 31 May 2017 03:42 )             22.3     Mg .6, Ca 4.5 Trops peaked at .23

## 2022-02-17 NOTE — ED PROVIDER NOTE - PROGRESS NOTE DETAILS
Dr. Cameron: CT showing thickening in bladder, but no dysuria or other complaints--will send UA/UCx; pt aware that CT showing "cannot exclude colon mass" and he will follow up with PMD within 1 week; pt does not want to wait for UA results, would like to leave now, so will have follow up by [id number] line
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
## 2022-02-17 NOTE — ED PROVIDER NOTE - NSFOLLOWUPINSTRUCTIONS_ED_ALL_ED_FT
If you have any severe increase in pain, fever, uncontrollable nausea/vomiting, or inability to tolerate eating and drinking you need to come back to the emergency room.    The results from your CT scan are included in your paperwork. Please follow up with your primary care doctor to discuss further testing.    You provided urine for a culture and additional tests. If there are any abnormalities you will receive a call regarding the results.    You may take 650 mg acetaminophen every eight hours as needed for pain.  You may take 600mg Ibuprofen (Advil) once every 8 hours as needed for pain. See medication label for warnings and use instructions.

## 2022-02-17 NOTE — ED PROVIDER NOTE - PATIENT PORTAL LINK FT
You can access the FollowMyHealth Patient Portal offered by [facility] by registering at the following website: [email or web address]. By joining [employer]’s FollowMyHealth portal, you will also be able to view your health information using other applications (apps) compatible with our system.
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
## 2022-02-17 NOTE — ED PROVIDER NOTE - PHYSICAL EXAMINATION
General: WN/WD NAD  Head: Atraumatic, normocephalic  Eyes: EOM grossly in tact, no scleral icterus, no discharge  ENT: moist mucous membranes  Neurology: A&Ox 3, nonfocal, TAMAYO x 4  Respiratory: CTAB, no wheezing, normal respiratory effort  CV: RRR, good s1/s2, no S3, Extremities warm and well perfused  Abdominal: Soft, non-distended, periumbilical TTP, no rebound or guarding  Extremities: No edema, no deformities  Skin: warm and dry. No rashes

## 2022-02-18 NOTE — ED POST DISCHARGE NOTE - RESULT SUMMARY
CONCHA Eisenberg: Telephone request from Dr. Cameron to f/u ua and uc given thickening of bladder on CT. UA negative. Culture pending. Pt will need further eval by pmd and possibly urology. Callback team to f/u.

## 2022-02-19 LAB
CULTURE RESULTS: SIGNIFICANT CHANGE UP
SPECIMEN SOURCE: SIGNIFICANT CHANGE UP

## 2022-04-01 ENCOUNTER — EMERGENCY (EMERGENCY)
Facility: HOSPITAL | Age: 52
LOS: 1 days | Discharge: HOME | End: 2022-04-01
Attending: EMERGENCY MEDICINE | Admitting: EMERGENCY MEDICINE
Payer: MEDICARE

## 2022-04-01 VITALS
RESPIRATION RATE: 16 BRPM | OXYGEN SATURATION: 98 % | HEART RATE: 64 BPM | SYSTOLIC BLOOD PRESSURE: 131 MMHG | DIASTOLIC BLOOD PRESSURE: 80 MMHG

## 2022-04-01 VITALS
DIASTOLIC BLOOD PRESSURE: 70 MMHG | SYSTOLIC BLOOD PRESSURE: 131 MMHG | RESPIRATION RATE: 18 BRPM | TEMPERATURE: 99 F | OXYGEN SATURATION: 100 % | HEIGHT: 65 IN | HEART RATE: 75 BPM
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
## 2022-04-01 LAB
ALBUMIN SERPL ELPH-MCNC: 3.7 G/DL — SIGNIFICANT CHANGE UP (ref 3.3–5)
ALP SERPL-CCNC: 56 U/L — SIGNIFICANT CHANGE UP (ref 40–120)
ALT FLD-CCNC: 18 U/L — SIGNIFICANT CHANGE UP (ref 4–41)
ANION GAP SERPL CALC-SCNC: 13 MMOL/L — SIGNIFICANT CHANGE UP (ref 7–14)
AST SERPL-CCNC: 26 U/L — SIGNIFICANT CHANGE UP (ref 4–40)
BASOPHILS # BLD AUTO: 0.04 K/UL — SIGNIFICANT CHANGE UP (ref 0–0.2)
BASOPHILS NFR BLD AUTO: 0.4 % — SIGNIFICANT CHANGE UP (ref 0–2)
BILIRUB SERPL-MCNC: 0.8 MG/DL — SIGNIFICANT CHANGE UP (ref 0.2–1.2)
BUN SERPL-MCNC: 14 MG/DL — SIGNIFICANT CHANGE UP (ref 7–23)
CALCIUM SERPL-MCNC: 8.9 MG/DL — SIGNIFICANT CHANGE UP (ref 8.4–10.5)
CHLORIDE SERPL-SCNC: 107 MMOL/L — SIGNIFICANT CHANGE UP (ref 98–107)
CO2 SERPL-SCNC: 17 MMOL/L — LOW (ref 22–31)
CREAT SERPL-MCNC: 0.78 MG/DL — SIGNIFICANT CHANGE UP (ref 0.5–1.3)
EGFR: 109 ML/MIN/1.73M2 — SIGNIFICANT CHANGE UP
EOSINOPHIL # BLD AUTO: 0.18 K/UL — SIGNIFICANT CHANGE UP (ref 0–0.5)
EOSINOPHIL NFR BLD AUTO: 1.9 % — SIGNIFICANT CHANGE UP (ref 0–6)
GLUCOSE SERPL-MCNC: 91 MG/DL — SIGNIFICANT CHANGE UP (ref 70–99)
HCT VFR BLD CALC: 40.7 % — SIGNIFICANT CHANGE UP (ref 39–50)
HGB BLD-MCNC: 14.1 G/DL — SIGNIFICANT CHANGE UP (ref 13–17)
IANC: 5.83 K/UL — SIGNIFICANT CHANGE UP (ref 1.8–7.4)
IMM GRANULOCYTES NFR BLD AUTO: 0.4 % — SIGNIFICANT CHANGE UP (ref 0–1.5)
LYMPHOCYTES # BLD AUTO: 2.15 K/UL — SIGNIFICANT CHANGE UP (ref 1–3.3)
LYMPHOCYTES # BLD AUTO: 23.2 % — SIGNIFICANT CHANGE UP (ref 13–44)
MCHC RBC-ENTMCNC: 32.4 PG — SIGNIFICANT CHANGE UP (ref 27–34)
MCHC RBC-ENTMCNC: 34.6 GM/DL — SIGNIFICANT CHANGE UP (ref 32–36)
MCV RBC AUTO: 93.6 FL — SIGNIFICANT CHANGE UP (ref 80–100)
MONOCYTES # BLD AUTO: 1.01 K/UL — HIGH (ref 0–0.9)
MONOCYTES NFR BLD AUTO: 10.9 % — SIGNIFICANT CHANGE UP (ref 2–14)
NEUTROPHILS # BLD AUTO: 5.83 K/UL — SIGNIFICANT CHANGE UP (ref 1.8–7.4)
NEUTROPHILS NFR BLD AUTO: 63.2 % — SIGNIFICANT CHANGE UP (ref 43–77)
NRBC # BLD: 0 /100 WBCS — SIGNIFICANT CHANGE UP
NRBC # FLD: 0 K/UL — SIGNIFICANT CHANGE UP
PLATELET # BLD AUTO: 157 K/UL — SIGNIFICANT CHANGE UP (ref 150–400)
POTASSIUM SERPL-MCNC: 4.5 MMOL/L — SIGNIFICANT CHANGE UP (ref 3.5–5.3)
POTASSIUM SERPL-SCNC: 4.5 MMOL/L — SIGNIFICANT CHANGE UP (ref 3.5–5.3)
PROT SERPL-MCNC: 6.6 G/DL — SIGNIFICANT CHANGE UP (ref 6–8.3)
RBC # BLD: 4.35 M/UL — SIGNIFICANT CHANGE UP (ref 4.2–5.8)
RBC # FLD: 12.2 % — SIGNIFICANT CHANGE UP (ref 10.3–14.5)
SODIUM SERPL-SCNC: 137 MMOL/L — SIGNIFICANT CHANGE UP (ref 135–145)
WBC # BLD: 9.25 K/UL — SIGNIFICANT CHANGE UP (ref 3.8–10.5)
WBC # FLD AUTO: 9.25 K/UL — SIGNIFICANT CHANGE UP (ref 3.8–10.5)

## 2022-04-01 PROCEDURE — 99284 EMERGENCY DEPT VISIT MOD MDM: CPT

## 2022-04-01 RX ORDER — OXYCODONE HYDROCHLORIDE 5 MG/1
5 TABLET ORAL ONCE
Refills: 0 | Status: COMPLETED | OUTPATIENT
Start: 2022-04-01 | End: 2022-04-01

## 2022-04-01 RX ORDER — MORPHINE SULFATE 50 MG/1
4 CAPSULE, EXTENDED RELEASE ORAL ONCE
Refills: 0 | Status: DISCONTINUED | OUTPATIENT
Start: 2022-04-01 | End: 2022-04-01

## 2022-04-01 RX ORDER — IBUPROFEN 200 MG
600 TABLET ORAL ONCE
Refills: 0 | Status: DISCONTINUED | OUTPATIENT
Start: 2022-04-01 | End: 2022-04-04

## 2022-04-01 RX ORDER — OXYCODONE HYDROCHLORIDE 5 MG/1
1 TABLET ORAL
Qty: 5 | Refills: 0
Start: 2022-04-01 | End: 2022-04-01

## 2022-04-01 RX ORDER — ACETAMINOPHEN 500 MG
650 TABLET ORAL ONCE
Refills: 0 | Status: DISCONTINUED | OUTPATIENT
Start: 2022-04-01 | End: 2022-04-04

## 2022-04-01 RX ADMIN — Medication 100 MILLIGRAM(S): at 06:19

## 2022-04-01 RX ADMIN — MORPHINE SULFATE 4 MILLIGRAM(S): 50 CAPSULE, EXTENDED RELEASE ORAL at 06:20

## 2022-04-01 NOTE — ED PROVIDER NOTE - PROGRESS NOTE DETAILS
MONICA: Pt was seen and eval'd by dental, They saw on panorex, a small abscess secondary to a partial root canal and pt was offered I&D of this but refused and opted to go to dental clinic for possible root canal despite knowing that the clinic may not perform the root canal today. Pt already s/p one dose IV clindamycin and will be sent home with Rx for same for 7 days and PO pain meds PRN. Billy Ignacio pt signed out to me. agree with above. Patient was re-evaluated and doing well. Results, including any incidental findings, were discussed. Return precautions and follow up were discussed. Patient verbalized understanding.

## 2022-04-01 NOTE — ED ADULT NURSE NOTE - DOES PATIENT HAVE ADVANCE DIRECTIVE
Anesthesia Evaluation     Patient summary reviewed and Nursing notes reviewed   NPO Solid Status: > 8 hours  NPO Liquid Status: > 8 hours           Airway   Mallampati: I  TM distance: >3 FB  Neck ROM: full  No difficulty expected  Dental      Pulmonary     breath sounds clear to auscultation  Cardiovascular     Rhythm: regular  Rate: normal        Neuro/Psych  (+) psychiatric history Anxiety,     GI/Hepatic/Renal/Endo      Musculoskeletal     Abdominal    Substance History      OB/GYN          Other                        Anesthesia Plan    ASA 2 - emergent     general     intravenous induction     Anesthetic plan, all risks, benefits, and alternatives have been provided, discussed and informed consent has been obtained with: patient and mother.      
No

## 2022-04-01 NOTE — ED PROVIDER NOTE - CLINICAL SUMMARY MEDICAL DECISION MAKING FREE TEXT BOX
50 yo M with no pertinent PMH presenting dental pain and L facial swelling x 1 day. significant swelling of L face, poor dentition, L upper molar with periapical swelling/fluctuance. concern for periapical abscess. plan for labs, pain control, abx and dental cs.

## 2022-04-01 NOTE — ED PROVIDER NOTE - PHYSICAL EXAMINATION
Gen: NAD, AAOx3, non-toxic appearing  HEENT: significant swelling of L face, poor dentition, L upper molar with periapical swelling/fluctuance  Lung: speaking in full sentences, good aeration bilaterally, lungs CTA b/l  CV: regular rate and rhythm. cap refill <2x. peripheral pulses 2+bilaterally   Abd: soft, ND, NT  MSK: no visible deformities  Neuro: No focal deficits  Skin: Intact  Psych: normal affect
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
## 2022-04-01 NOTE — ED PROVIDER NOTE - PATIENT PORTAL LINK FT
You can access the FollowMyHealth Patient Portal offered by [facility] by registering at the following website: [email or web address]. By joining [employer]’s FollowMyHealth portal, you will also be able to view your health information using other applications (apps) compatible with our system.

## 2022-04-01 NOTE — ED PROVIDER NOTE - OBJECTIVE STATEMENT
52 yo M with no pertinent PMH presenting dental pain and L facial swelling x 1 day. States that he noticed that a molar filling has dislodged a few days ago followed by significant pain in the L upper molar region. Took Motrin for the pain with minimal relief prompting ER visit. Denies fevers/chills, nausea, vomiting, difficulty swallowing. Patient states he has not seen a dentist in "years." Patient is a daily smoker, denies alcohol/drug use.

## 2022-04-01 NOTE — ED ADULT TRIAGE NOTE - CHIEF COMPLAINT QUOTE
Pt has left sided facial swelling since last night. Denies fever. Pt says his left upper molar tooth filling had come out a few days ago. c/o severe pain to the area. Took Motrin 400mg 3 hours ago with no relief.

## 2022-04-01 NOTE — ED PROVIDER NOTE - NSFOLLOWUPINSTRUCTIONS_ED_ALL_ED_FT
You were seen in the ED for a dental abscess.  Please follow up in the dental clinic in 1-2 days for further management.    Take the antibiotic clindamycin as prescribed.    For pain, please take 650mg Tylenol every 6 hours as needed or 600mg ibuprofen every 8 hours as needed. Always take ibuprofen with food. You make take both Tylenol and ibuprofen as described above if one medication is not enough for your pain.    Please follow up with your primary care doctor in 2-3 days. Return to the ED if you experience any worsening or new symptoms or any symptoms that concern you, including fevers, chills, shortness of breath, chest pain, worsening pain, worsening swelling, inability to swallow saliva.    For dental either call the [facility] dental clinic [phone] to make an appointment.  [facility]: [phone]  Or a dentist of your choice

## 2022-04-01 NOTE — ED PROVIDER NOTE - NS ED ROS FT
Constitutional:  (-) fever, (-) chills, (-) fatigue  Eyes:  (-) eye pain (-) visual changes  ENMT: (+) L facial swelling (-) nasal discharge, (-) sore throat. (-) neck pain or stiffness  Cardiac: (-) chest pain (-) palpitations  Respiratory:  (-) cough (-) shortness of breath  GI:  (-) nausea (-) vomiting (-) diarrhea (-) abdominal pain  :  (-) dysuria (-) frequency (-) burning  MS:  (-) back pain (-) joint pain  Neuro:  (-) headache (-) numbness (-) tingling (-) focal weakness  Skin:  (-) rash  Except as documented in the HPI,  all other systems are negative

## 2022-04-01 NOTE — ED PROVIDER NOTE - ATTENDING CONTRIBUTION TO CARE
HPI: 52 yo M with no pertinent PMH presenting dental pain and L facial swelling x 1 day. States that he noticed that a molar filling has dislodged a few days ago followed by significant pain in the L upper molar region. Took Motrin for the pain with minimal relief prompting ER visit. Denies fevers/chills, nausea, vomiting, difficulty swallowing. Patient states he has not seen a dentist in "years." Patient is a daily smoker, denies alcohol/drug use.  EXAM: Uncomfortable, speaking full sentences, no gross facial deformities, minimal left facial swelling without palpable fluctuance, no crepitus or erythema, no mastoid tenderness to palpation bilaterally, oropharynx with midline uvula, flat tongue, no discharge or bleeding, poor dentition with left upper molar with open wound, tenderness to palpation with abscess. no trismus. No fluctuance or tenderness to palpation mandible or neck/throat. No hoarse voice.   MDM: pt with no reported Past Medical History that has poor dentition on exam and found to have abscess in left upper molar likely accounting for minimal facial swelling appreciated on exam. No fever, chills, trismus on exam. Speaking full sentences. Will consult dental for I&D and start IVAbx x 1 and provide pain meds and reassess after dental eval.

## 2022-06-30 NOTE — ED ADULT NURSE NOTE - NS ED NURSE DISCH DISPOSITION
Care Due:                  Date            Visit Type   Department     Provider  --------------------------------------------------------------------------------                                EP -                              PRIMARY      [facility]  Last Visit: 04-      CARE ([facility])   CARE           Licha Allen  Next Visit: None Scheduled  None         None Found                                                            Last  Test          Frequency    Reason                     Performed    Due Date  --------------------------------------------------------------------------------    Office Visit  12 months..  famotidine...............  04- 04-    Cr..........  12 months..  famotidine...............  11- 11-    [employer] Embedded Care Gaps. Reference number: [id number]. 6/30/2022   9:30:47 AM [facility]   Discharged
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
## 2023-10-12 NOTE — ED ADULT NURSE NOTE - CAS EDN DISCHARGE ASSESSMENT
Lengthy discussion with patient.  I feel she needs extensive lab work, CT scan of the abdomen to rule out gallstones or peptic ulcer.  She may also need Gastroenterology consult as soon as possible.  She states she can not get an appointment  to see gastroenterologist for over a month  I feel she needs to go directly to the emergency room for lab work imaging studies and perhaps admission.  You will also need a Gastroenterology consult.  She agrees and full report given to [facility] accepts the patient.    She and her boyfriend left the clinic in stable condition   Alert and oriented to person, place and time

## 2024-02-06 NOTE — ED PROVIDER NOTE - NS ED MD DISPO DISCHARGE CCDA
Patient/Caregiver provided printed discharge information. Expected Date Of Service: 02/06/2024 Lab Facility: [id number] Performing Laboratory: [id number] Billing Type: Third-Party Bill Bill For Surgical Tray: no
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
## 2024-08-01 NOTE — ED ADULT NURSE NOTE - NS TRANSFER PATIENT BELONGINGS
"Subjective   Patient ID: Wilder Mota is a 18 y.o. male who presents for Illness (Patient here today with mom, having headaches, stomach pain, throwing up, hot at night, trouble sleeping, dizziness x 2 weeks.).    Past Medical, Surgical, and Family History reviewed and updated in chart.     Reviewed all medications by prescribing practitioner or clinical pharmacist (such as prescriptions, OTCs, herbal therapies and supplements) and documented in the medical record.    HPI   Patient in office with concerns of not feeling well the past two weeks. Throwing up headaches, fatigue, dizziness, sore throat. Unsure if has has fever but states having night sweats, Denies diarrhea or constipation. Tylenol and tums over the counter. Monday and Tuesday felt better went to work then Wednesday ate lunch then start throwing up again. Throwing up mostly food some bile at times. Denies light sensitivity with headache or auras, mainly on top of head is where pain is.     Sick contact,Co workers sick two weeks ago.   Has been able to keep food and drinking down.     Review of Systems   Constitutional:  Positive for chills and fatigue. Negative for fever.   HENT: Negative.     Eyes: Negative.    Respiratory: Negative.  Negative for choking, chest tightness and shortness of breath.    Cardiovascular: Negative.  Negative for chest pain and palpitations.   Gastrointestinal:  Positive for constipation, nausea and vomiting. Negative for diarrhea.   Endocrine: Negative.    Genitourinary: Negative.    Musculoskeletal: Negative.    Skin: Negative.    Allergic/Immunologic: Negative.    Neurological:  Positive for dizziness and headaches.   Hematological: Negative.    Psychiatric/Behavioral: Negative.     All other systems reviewed and are negative.      Objective   /64   Pulse 83   Temp 36.6 °C (97.8 °F)   Ht 1.707 m (5' 7.2\")   Wt 75.3 kg   SpO2 98%   BMI 25.84 kg/m²     Physical Exam  Constitutional:       Appearance: Normal " appearance.   HENT:      Head: Normocephalic and atraumatic.      Nose: Nose normal.      Mouth/Throat:      Mouth: Mucous membranes are moist.      Pharynx: Oropharynx is clear.   Eyes:      Pupils: Pupils are equal, round, and reactive to light.   Cardiovascular:      Rate and Rhythm: Normal rate and regular rhythm.      Pulses: Normal pulses.      Heart sounds: Normal heart sounds.   Pulmonary:      Effort: Pulmonary effort is normal.      Breath sounds: Normal breath sounds.   Abdominal:      General: Bowel sounds are normal.      Palpations: Abdomen is soft.   Musculoskeletal:         General: Normal range of motion.      Cervical back: Normal range of motion.   Skin:     General: Skin is warm and dry.   Neurological:      General: No focal deficit present.      Mental Status: He is alert and oriented to person, place, and time.   Psychiatric:         Mood and Affect: Mood normal.         Behavior: Behavior normal.         Thought Content: Thought content normal.         Judgment: Judgment normal.         Assessment/Plan   Problem List Items Addressed This Visit       Allergic rhinitis    Relevant Medications    cetirizine (ZyrTEC) 10 mg tablet     Other Visit Diagnoses       Allergy, initial encounter    -  Primary    Nausea and vomiting, unspecified vomiting type        Relevant Medications    ondansetron ODT (Zofran-ODT) 4 mg disintegrating tablet    Other Relevant Orders    CBC and Auto Differential (Completed)    Comprehensive metabolic panel (Completed)    Mononucleosis screen (Completed)    Other fatigue        Relevant Orders    CBC and Auto Differential (Completed)    Comprehensive metabolic panel (Completed)    Mononucleosis screen (Completed)    Sore throat        Relevant Orders    POCT Rapid Strep A manually resulted (Completed)                Clothing

## 2024-12-03 NOTE — ED ADULT TRIAGE NOTE - MODE OF ARRIVAL
Treatment Goal Met?: no
Treatment Goal Explanation (Does Not Render In The Note): Stable for the purposes of categorizing medical decision making is defined by the specific treatment goals for an individual patient. A patient that is not at their treatment goal is not stable, even if the condition has not changed and there is no short- term threat to life or function.
Walk in